# Patient Record
Sex: FEMALE | Race: WHITE | NOT HISPANIC OR LATINO | Employment: OTHER | URBAN - METROPOLITAN AREA
[De-identification: names, ages, dates, MRNs, and addresses within clinical notes are randomized per-mention and may not be internally consistent; named-entity substitution may affect disease eponyms.]

---

## 2017-09-27 ENCOUNTER — OFFICE VISIT (OUTPATIENT)
Dept: OBSTETRICS AND GYNECOLOGY | Facility: CLINIC | Age: 52
End: 2017-09-27

## 2017-09-27 VITALS
DIASTOLIC BLOOD PRESSURE: 70 MMHG | HEIGHT: 63 IN | WEIGHT: 131.4 LBS | SYSTOLIC BLOOD PRESSURE: 108 MMHG | BODY MASS INDEX: 23.28 KG/M2

## 2017-09-27 DIAGNOSIS — R10.32 LLQ ABDOMINAL PAIN: Primary | ICD-10-CM

## 2017-09-27 DIAGNOSIS — Z01.411 ENCOUNTER FOR GYNECOLOGICAL EXAMINATION WITH ABNORMAL FINDING: ICD-10-CM

## 2017-09-27 DIAGNOSIS — Z85.3 PERSONAL HISTORY OF BREAST CANCER: ICD-10-CM

## 2017-09-27 DIAGNOSIS — N60.12 FIBROCYSTIC BREAST CHANGES, BILATERAL: ICD-10-CM

## 2017-09-27 DIAGNOSIS — N60.11 FIBROCYSTIC BREAST CHANGES, BILATERAL: ICD-10-CM

## 2017-09-27 PROCEDURE — 99386 PREV VISIT NEW AGE 40-64: CPT | Performed by: OBSTETRICS & GYNECOLOGY

## 2017-09-27 RX ORDER — PROPRANOLOL HYDROCHLORIDE 80 MG/1
1 CAPSULE, EXTENDED RELEASE ORAL DAILY
COMMUNITY
Start: 2017-08-14 | End: 2020-12-30

## 2017-09-27 RX ORDER — SUMATRIPTAN 100 MG/1
100 TABLET, FILM COATED ORAL ONCE AS NEEDED
COMMUNITY

## 2017-09-28 ENCOUNTER — LAB (OUTPATIENT)
Dept: LAB | Facility: HOSPITAL | Age: 52
End: 2017-09-28

## 2017-09-28 ENCOUNTER — OFFICE VISIT (OUTPATIENT)
Dept: OBSTETRICS AND GYNECOLOGY | Facility: CLINIC | Age: 52
End: 2017-09-28

## 2017-09-28 VITALS
DIASTOLIC BLOOD PRESSURE: 70 MMHG | SYSTOLIC BLOOD PRESSURE: 108 MMHG | HEIGHT: 63 IN | WEIGHT: 131 LBS | BODY MASS INDEX: 23.21 KG/M2

## 2017-09-28 DIAGNOSIS — N83.201 OVARIAN CYST, RIGHT: ICD-10-CM

## 2017-09-28 DIAGNOSIS — R10.32 LLQ ABDOMINAL PAIN: ICD-10-CM

## 2017-09-28 DIAGNOSIS — R93.89 THICKENED ENDOMETRIUM: Primary | ICD-10-CM

## 2017-09-28 LAB — CANCER AG125 SERPL QL: 11.8 U/ML (ref 0–30.2)

## 2017-09-28 PROCEDURE — 86304 IMMUNOASSAY TUMOR CA 125: CPT | Performed by: OBSTETRICS & GYNECOLOGY

## 2017-09-28 PROCEDURE — 99213 OFFICE O/P EST LOW 20 MIN: CPT | Performed by: OBSTETRICS & GYNECOLOGY

## 2017-09-28 PROCEDURE — 36415 COLL VENOUS BLD VENIPUNCTURE: CPT

## 2017-10-05 ENCOUNTER — TELEPHONE (OUTPATIENT)
Dept: OBSTETRICS AND GYNECOLOGY | Facility: CLINIC | Age: 52
End: 2017-10-05

## 2017-10-26 ENCOUNTER — DOCUMENTATION (OUTPATIENT)
Dept: OBSTETRICS AND GYNECOLOGY | Facility: CLINIC | Age: 52
End: 2017-10-26

## 2017-10-26 ENCOUNTER — TELEPHONE (OUTPATIENT)
Dept: OBSTETRICS AND GYNECOLOGY | Facility: CLINIC | Age: 52
End: 2017-10-26

## 2017-11-03 ENCOUNTER — LAB REQUISITION (OUTPATIENT)
Dept: LAB | Facility: HOSPITAL | Age: 52
End: 2017-11-03

## 2017-11-03 ENCOUNTER — OUTSIDE FACILITY SERVICE (OUTPATIENT)
Dept: OBSTETRICS AND GYNECOLOGY | Facility: CLINIC | Age: 52
End: 2017-11-03

## 2017-11-03 DIAGNOSIS — R10.32 LEFT LOWER QUADRANT PAIN: ICD-10-CM

## 2017-11-03 PROCEDURE — 88305 TISSUE EXAM BY PATHOLOGIST: CPT | Performed by: OBSTETRICS & GYNECOLOGY

## 2017-11-03 PROCEDURE — 58558 HYSTEROSCOPY BIOPSY: CPT | Performed by: OBSTETRICS & GYNECOLOGY

## 2017-11-06 LAB
CYTO UR: NORMAL
LAB AP CASE REPORT: NORMAL
LAB AP CLINICAL INFORMATION: NORMAL
Lab: NORMAL
PATH REPORT.FINAL DX SPEC: NORMAL
PATH REPORT.GROSS SPEC: NORMAL

## 2017-12-02 PROBLEM — Z01.419 WELL WOMAN EXAM WITH ROUTINE GYNECOLOGICAL EXAM: Status: ACTIVE | Noted: 2017-12-02

## 2018-09-14 ENCOUNTER — HOSPITAL ENCOUNTER (OUTPATIENT)
Age: 53
End: 2018-09-14
Payer: COMMERCIAL

## 2018-09-14 DIAGNOSIS — R41.89: ICD-10-CM

## 2018-09-14 DIAGNOSIS — R53.83: ICD-10-CM

## 2018-09-14 DIAGNOSIS — R05: Primary | ICD-10-CM

## 2018-09-14 DIAGNOSIS — R53.81: ICD-10-CM

## 2018-09-14 LAB
ALBUMIN LEVEL: 4.2 GM/DL (ref 3.4–5)
ALBUMIN/GLOB SERPL: 1.6 {RATIO} (ref 1.1–1.8)
ALP ISO SERPL-ACNC: 54 U/L (ref 46–116)
ALT SERPLBLD-CCNC: 30 U/L (ref 12–78)
ANION GAP SERPL CALC-SCNC: 12.2 MEQ/L (ref 5–15)
AST SERPL QL: 19 U/L (ref 15–37)
BILIRUBIN,TOTAL: 0.7 MG/DL (ref 0.2–1)
BUN SERPL-MCNC: 7 MG/DL (ref 7–18)
CALCIUM SPEC-MCNC: 8.7 MG/DL (ref 8.5–10.1)
CHLORIDE SPEC-SCNC: 106 MMOL/L (ref 98–107)
CHOLEST SPEC-SCNC: 204 MG/DL (ref 140–200)
CO2 SERPL-SCNC: 29 MMOL/L (ref 21–32)
CREAT BLD-SCNC: 0.6 MG/DL (ref 0.55–1.02)
ESTIMATED GLOMERULAR FILT RATE: 104 ML/MIN (ref 60–?)
FT4I SERPL CALC-MCNC: 3.6 UG/DL (ref 5.93–13.13)
GFR (AFRICAN AMERICAN): 126 ML/MIN (ref 60–?)
GLOBULIN SER CALC-MCNC: 2.7 GM/DL (ref 1.3–3.2)
GLUCOSE: 79 MG/DL (ref 74–106)
HCT VFR BLD CALC: 39.6 % (ref 37–47)
HDLC SERPL-MCNC: 74 MG/DL (ref 29–89)
HGB BLD-MCNC: 13.3 G/DL (ref 12.2–16.2)
MCHC RBC-ENTMCNC: 33.6 G/DL (ref 31.8–35.4)
MCV RBC: 87.7 FL (ref 81–99)
MEAN CORPUSCULAR HEMOGLOBIN: 29.5 PG (ref 27–31.2)
PLATELET # BLD: 215 K/MM3 (ref 142–424)
POTASSIUM: 4.2 MMOL/L (ref 3.5–5.1)
PROT SERPL-MCNC: 6.9 GM/DL (ref 6.4–8.2)
RBC # BLD AUTO: 4.51 M/MM3 (ref 4.2–5.4)
SODIUM SPEC-SCNC: 143 MMOL/L (ref 136–145)
T3RU NFR SERPL: 36 % (ref 31–39)
T4 (THYROXINE): 9.9 UG/DL (ref 4.7–13.3)
TRIGLYCERIDES: 47 MG/DL (ref 30–200)
TSH SERPL-ACNC: 1.56 UIU/ML (ref 0.36–3.74)
WBC # BLD AUTO: 4.8 K/MM3 (ref 4.8–10.8)

## 2018-09-14 PROCEDURE — 84436 ASSAY OF TOTAL THYROXINE: CPT

## 2018-09-14 PROCEDURE — 36415 COLL VENOUS BLD VENIPUNCTURE: CPT

## 2018-09-14 PROCEDURE — 82131 AMINO ACIDS SINGLE QUANT: CPT

## 2018-09-14 PROCEDURE — 84443 ASSAY THYROID STIM HORMONE: CPT

## 2018-09-14 PROCEDURE — 84479 ASSAY OF THYROID (T3 OR T4): CPT

## 2018-09-14 PROCEDURE — 80053 COMPREHEN METABOLIC PANEL: CPT

## 2018-09-14 PROCEDURE — 82652 VIT D 1 25-DIHYDROXY: CPT

## 2018-09-14 PROCEDURE — 71046 X-RAY EXAM CHEST 2 VIEWS: CPT

## 2018-09-14 PROCEDURE — 85025 COMPLETE CBC W/AUTO DIFF WBC: CPT

## 2018-09-14 PROCEDURE — 80061 LIPID PANEL: CPT

## 2018-09-14 PROCEDURE — 82607 VITAMIN B-12: CPT

## 2018-09-17 LAB — VITAMIN B12: 581 PG/ML (ref 232–1245)

## 2018-11-27 ENCOUNTER — HOSPITAL ENCOUNTER (OUTPATIENT)
Age: 53
End: 2018-11-27
Payer: COMMERCIAL

## 2018-11-27 DIAGNOSIS — E11.65: ICD-10-CM

## 2018-11-27 DIAGNOSIS — E06.3: ICD-10-CM

## 2018-11-27 DIAGNOSIS — R53.82: Primary | ICD-10-CM

## 2018-11-27 DIAGNOSIS — R79.82: ICD-10-CM

## 2018-11-27 DIAGNOSIS — T78.40XA: ICD-10-CM

## 2018-11-27 DIAGNOSIS — E78.5: ICD-10-CM

## 2018-11-27 DIAGNOSIS — E53.8: ICD-10-CM

## 2018-11-27 DIAGNOSIS — D50.9: ICD-10-CM

## 2018-11-27 DIAGNOSIS — E78.49: ICD-10-CM

## 2018-11-27 DIAGNOSIS — E55.9: ICD-10-CM

## 2018-11-27 DIAGNOSIS — D51.9: ICD-10-CM

## 2018-11-27 DIAGNOSIS — E72.10: ICD-10-CM

## 2018-11-27 DIAGNOSIS — E72.19: ICD-10-CM

## 2018-11-27 DIAGNOSIS — E03.9: ICD-10-CM

## 2018-11-27 DIAGNOSIS — M35.9: ICD-10-CM

## 2018-11-27 DIAGNOSIS — D89.89: ICD-10-CM

## 2018-11-27 DIAGNOSIS — R73.01: ICD-10-CM

## 2018-11-27 LAB
ALBUMIN LEVEL: 4.2 GM/DL (ref 3.4–5)
ALBUMIN/GLOB SERPL: 1.4 {RATIO} (ref 1.1–1.8)
ALP ISO SERPL-ACNC: 46 U/L (ref 46–116)
ALT SERPLBLD-CCNC: 75 U/L (ref 12–78)
ANION GAP SERPL CALC-SCNC: 14.3 MEQ/L (ref 5–15)
AST SERPL QL: 55 U/L (ref 15–37)
BILIRUBIN,TOTAL: 0.6 MG/DL (ref 0.2–1)
BUN SERPL-MCNC: 10 MG/DL (ref 7–18)
CALCIUM SPEC-MCNC: 9.2 MG/DL (ref 8.5–10.1)
CHLORIDE SPEC-SCNC: 101 MMOL/L (ref 98–107)
CHOLEST SPEC-SCNC: 237 MG/DL (ref 140–200)
CO2 SERPL-SCNC: 26 MMOL/L (ref 21–32)
COLOR UR: YELLOW
CREAT BLD-SCNC: 0.58 MG/DL (ref 0.55–1.02)
ESTIMATED GLOMERULAR FILT RATE: 108 ML/MIN (ref 60–?)
FERRITIN SERPL-MCNC: 68 NG/ML (ref 8–388)
GFR (AFRICAN AMERICAN): 131 ML/MIN (ref 60–?)
GLOBULIN SER CALC-MCNC: 3 GM/DL (ref 1.3–3.2)
GLUCOSE: 79 MG/DL (ref 74–106)
HBA1C MFR BLD: 5.2 % (ref 0–7)
HDLC SERPL-MCNC: 95 MG/DL (ref 29–89)
KETONES UR STRIP.AUTO-MCNC: (no result) MG/DL
MICRO URNS: (no result)
PH UR: 6 [PH] (ref 5–8.5)
POTASSIUM: 4.3 MMOL/L (ref 3.5–5.1)
PROT SERPL-MCNC: 7.2 GM/DL (ref 6.4–8.2)
SODIUM SPEC-SCNC: 137 MMOL/L (ref 136–145)
SP GR UR: 1.01 (ref 1–1.03)
SQUAMOUS URNS QL MICRO: (no result) #/HPF (ref 0–5)
T4 FREE SERPL-MCNC: 1 NG/DL (ref 0.76–1.46)
TRIGLYCERIDES: 49 MG/DL (ref 30–200)
TSH SERPL-ACNC: 0.7 UIU/ML (ref 0.36–3.74)
UROBILINOGEN UR QL: 0.2 EU/DL

## 2018-11-27 PROCEDURE — 86800 THYROGLOBULIN ANTIBODY: CPT

## 2018-11-27 PROCEDURE — 36415 COLL VENOUS BLD VENIPUNCTURE: CPT

## 2018-11-27 PROCEDURE — 86663 EPSTEIN-BARR ANTIBODY: CPT

## 2018-11-27 PROCEDURE — 83525 ASSAY OF INSULIN: CPT

## 2018-11-27 PROCEDURE — 84443 ASSAY THYROID STIM HORMONE: CPT

## 2018-11-27 PROCEDURE — 86738 MYCOPLASMA ANTIBODY: CPT

## 2018-11-27 PROCEDURE — 84481 FREE ASSAY (FT-3): CPT

## 2018-11-27 PROCEDURE — 83735 ASSAY OF MAGNESIUM: CPT

## 2018-11-27 PROCEDURE — 80061 LIPID PANEL: CPT

## 2018-11-27 PROCEDURE — 81001 URINALYSIS AUTO W/SCOPE: CPT

## 2018-11-27 PROCEDURE — 82607 VITAMIN B-12: CPT

## 2018-11-27 PROCEDURE — 83540 ASSAY OF IRON: CPT

## 2018-11-27 PROCEDURE — 82785 ASSAY OF IGE: CPT

## 2018-11-27 PROCEDURE — 83090 ASSAY OF HOMOCYSTEINE: CPT

## 2018-11-27 PROCEDURE — 86038 ANTINUCLEAR ANTIBODIES: CPT

## 2018-11-27 PROCEDURE — 86665 EPSTEIN-BARR CAPSID VCA: CPT

## 2018-11-27 PROCEDURE — 80053 COMPREHEN METABOLIC PANEL: CPT

## 2018-11-27 PROCEDURE — 83550 IRON BINDING TEST: CPT

## 2018-11-27 PROCEDURE — 86376 MICROSOMAL ANTIBODY EACH: CPT

## 2018-11-27 PROCEDURE — 83036 HEMOGLOBIN GLYCOSYLATED A1C: CPT

## 2018-11-27 PROCEDURE — 84439 ASSAY OF FREE THYROXINE: CPT

## 2018-11-27 PROCEDURE — 86664 EPSTEIN-BARR NUCLEAR ANTIGEN: CPT

## 2018-11-27 PROCEDURE — 82728 ASSAY OF FERRITIN: CPT

## 2018-11-27 PROCEDURE — 86141 C-REACTIVE PROTEIN HS: CPT

## 2018-11-27 PROCEDURE — 82652 VIT D 1 25-DIHYDROXY: CPT

## 2018-11-28 LAB
EBV EA AB SER IA-ACNC: 40.1 U/ML (ref 0–8.9)
EBV NUCLEAR ANTIGEN AB, IGG: 88.7 U/ML (ref 0–17.9)
INSULIN SERPL-MCNC: 3.9 UIU/ML (ref 2.6–24.9)
IRON: 77 UG/DL (ref 27–159)
T3FREE SERPL-MCNC: 2.3 PG/ML (ref 2–4.4)
UIBC: 260 UG/DL (ref 131–425)
VITAMIN B12: 813 PG/ML (ref 232–1245)

## 2018-11-30 LAB
INSULIN SERPL-MCNC: 104.4 UIU/ML (ref 2.6–24.9)
MAGNESIUM RBC-MCNC: 5.5 MG/DL (ref 4.2–6.8)

## 2020-09-02 ENCOUNTER — HOSPITAL ENCOUNTER (OUTPATIENT)
Age: 55
End: 2020-09-02
Payer: COMMERCIAL

## 2020-09-02 DIAGNOSIS — R04.2: Primary | ICD-10-CM

## 2020-09-02 PROCEDURE — 71046 X-RAY EXAM CHEST 2 VIEWS: CPT

## 2020-12-02 ENCOUNTER — OFFICE VISIT (OUTPATIENT)
Dept: INTERNAL MEDICINE | Facility: CLINIC | Age: 55
End: 2020-12-02

## 2020-12-02 VITALS
SYSTOLIC BLOOD PRESSURE: 116 MMHG | BODY MASS INDEX: 20.91 KG/M2 | HEIGHT: 63 IN | TEMPERATURE: 97.1 F | DIASTOLIC BLOOD PRESSURE: 70 MMHG | HEART RATE: 68 BPM | WEIGHT: 118 LBS

## 2020-12-02 DIAGNOSIS — D05.11 NEOPLASM OF RIGHT BREAST, PRIMARY TUMOR STAGING CATEGORY TIS: DUCTAL CARCINOMA IN SITU (DCIS): ICD-10-CM

## 2020-12-02 DIAGNOSIS — G43.009 MIGRAINE WITHOUT AURA AND WITHOUT STATUS MIGRAINOSUS, NOT INTRACTABLE: ICD-10-CM

## 2020-12-02 DIAGNOSIS — E78.49 OTHER HYPERLIPIDEMIA: Primary | ICD-10-CM

## 2020-12-02 DIAGNOSIS — Z01.419 ENCOUNTER FOR GYNECOLOGICAL EXAMINATION: ICD-10-CM

## 2020-12-02 DIAGNOSIS — R07.89 LEFT-SIDED CHEST WALL PAIN: ICD-10-CM

## 2020-12-02 DIAGNOSIS — R10.12 LEFT UPPER QUADRANT ABDOMINAL PAIN: ICD-10-CM

## 2020-12-02 PROCEDURE — 99203 OFFICE O/P NEW LOW 30 MIN: CPT | Performed by: INTERNAL MEDICINE

## 2020-12-04 ENCOUNTER — HOSPITAL ENCOUNTER (OUTPATIENT)
Dept: GENERAL RADIOLOGY | Facility: HOSPITAL | Age: 55
Discharge: HOME | End: 2020-12-04
Admitting: INTERNAL MEDICINE

## 2020-12-04 PROCEDURE — 71101 X-RAY EXAM UNILAT RIBS/CHEST: CPT

## 2020-12-11 ENCOUNTER — HOSPITAL ENCOUNTER (OUTPATIENT)
Dept: CT IMAGING | Facility: HOSPITAL | Age: 55
Discharge: HOME | End: 2020-12-11
Admitting: INTERNAL MEDICINE

## 2020-12-11 PROCEDURE — 25010000002 IOPAMIDOL 61 % SOLUTION: Performed by: INTERNAL MEDICINE

## 2020-12-11 PROCEDURE — 74177 CT ABD & PELVIS W/CONTRAST: CPT

## 2020-12-11 RX ADMIN — IOPAMIDOL 95 ML: 612 INJECTION, SOLUTION INTRAVENOUS at 09:53

## 2020-12-22 ENCOUNTER — HOSPITAL ENCOUNTER (OUTPATIENT)
Age: 55
End: 2020-12-22
Payer: COMMERCIAL

## 2020-12-22 DIAGNOSIS — M25.552: Primary | ICD-10-CM

## 2020-12-22 PROCEDURE — 73502 X-RAY EXAM HIP UNI 2-3 VIEWS: CPT

## 2020-12-30 ENCOUNTER — OFFICE VISIT (OUTPATIENT)
Dept: OBSTETRICS AND GYNECOLOGY | Facility: CLINIC | Age: 55
End: 2020-12-30

## 2020-12-30 VITALS
SYSTOLIC BLOOD PRESSURE: 110 MMHG | HEIGHT: 63 IN | BODY MASS INDEX: 21.19 KG/M2 | DIASTOLIC BLOOD PRESSURE: 72 MMHG | WEIGHT: 119.6 LBS

## 2020-12-30 DIAGNOSIS — Z01.411 ENCOUNTER FOR GYNECOLOGICAL EXAMINATION WITH ABNORMAL FINDING: ICD-10-CM

## 2020-12-30 DIAGNOSIS — Z78.0 MENOPAUSE: Primary | ICD-10-CM

## 2020-12-30 DIAGNOSIS — R10.32 LLQ ABDOMINAL PAIN: ICD-10-CM

## 2020-12-30 PROBLEM — Z01.419 WELL WOMAN EXAM WITH ROUTINE GYNECOLOGICAL EXAM: Status: RESOLVED | Noted: 2017-12-02 | Resolved: 2020-12-30

## 2020-12-30 PROCEDURE — 99386 PREV VISIT NEW AGE 40-64: CPT | Performed by: OBSTETRICS & GYNECOLOGY

## 2020-12-30 RX ORDER — TRIAMCINOLONE ACETONIDE 1 MG/G
CREAM TOPICAL
COMMUNITY
Start: 2020-12-17 | End: 2022-05-02

## 2022-05-02 ENCOUNTER — OFFICE VISIT (OUTPATIENT)
Dept: OBSTETRICS AND GYNECOLOGY | Facility: CLINIC | Age: 57
End: 2022-05-02

## 2022-05-02 VITALS
SYSTOLIC BLOOD PRESSURE: 112 MMHG | DIASTOLIC BLOOD PRESSURE: 80 MMHG | BODY MASS INDEX: 21.09 KG/M2 | WEIGHT: 119 LBS | HEIGHT: 63 IN

## 2022-05-02 DIAGNOSIS — Z12.11 SCREENING FOR COLON CANCER: ICD-10-CM

## 2022-05-02 DIAGNOSIS — Z86.000 HISTORY OF DUCTAL CARCINOMA IN SITU (DCIS) OF BREAST: ICD-10-CM

## 2022-05-02 DIAGNOSIS — Z01.411 ENCOUNTER FOR GYNECOLOGICAL EXAMINATION WITH ABNORMAL FINDING: Primary | ICD-10-CM

## 2022-05-02 PROCEDURE — 99396 PREV VISIT EST AGE 40-64: CPT | Performed by: NURSE PRACTITIONER

## 2022-05-02 RX ORDER — FLASH GLUCOSE SENSOR
KIT MISCELLANEOUS SEE ADMIN INSTRUCTIONS
COMMUNITY
Start: 2022-03-24

## 2022-05-03 DIAGNOSIS — Z12.11 ENCOUNTER FOR SCREENING COLONOSCOPY: Primary | ICD-10-CM

## 2022-12-09 ENCOUNTER — HOSPITAL ENCOUNTER (OUTPATIENT)
Age: 57
End: 2022-12-09
Payer: COMMERCIAL

## 2022-12-09 DIAGNOSIS — B95.2: ICD-10-CM

## 2022-12-09 DIAGNOSIS — B95.7: ICD-10-CM

## 2022-12-09 DIAGNOSIS — R05.8: Primary | ICD-10-CM

## 2022-12-09 PROCEDURE — 87077 CULTURE AEROBIC IDENTIFY: CPT

## 2022-12-09 PROCEDURE — 87205 SMEAR GRAM STAIN: CPT

## 2022-12-09 PROCEDURE — 87070 CULTURE OTHR SPECIMN AEROBIC: CPT

## 2022-12-09 PROCEDURE — 87186 SC STD MICRODIL/AGAR DIL: CPT

## 2023-05-04 ENCOUNTER — OFFICE VISIT (OUTPATIENT)
Dept: OBSTETRICS AND GYNECOLOGY | Facility: CLINIC | Age: 58
End: 2023-05-04
Payer: COMMERCIAL

## 2023-05-04 VITALS
WEIGHT: 118.4 LBS | SYSTOLIC BLOOD PRESSURE: 108 MMHG | BODY MASS INDEX: 20.98 KG/M2 | DIASTOLIC BLOOD PRESSURE: 72 MMHG | HEIGHT: 63 IN

## 2023-05-04 DIAGNOSIS — Z01.411 ENCOUNTER FOR GYNECOLOGICAL EXAMINATION WITH ABNORMAL FINDING: Primary | ICD-10-CM

## 2023-05-04 DIAGNOSIS — N95.2 ATROPHY OF VAGINA: ICD-10-CM

## 2023-05-04 RX ORDER — BUDESONIDE 1 MG/2ML
INHALANT ORAL AS NEEDED
COMMUNITY
Start: 2023-03-06

## 2023-05-04 RX ORDER — MELATONIN
1000 DAILY
COMMUNITY

## 2023-05-12 ENCOUNTER — TELEPHONE (OUTPATIENT)
Dept: OBSTETRICS AND GYNECOLOGY | Facility: CLINIC | Age: 58
End: 2023-05-12
Payer: COMMERCIAL

## 2023-05-15 ENCOUNTER — TELEPHONE (OUTPATIENT)
Dept: OBSTETRICS AND GYNECOLOGY | Facility: CLINIC | Age: 58
End: 2023-05-15
Payer: COMMERCIAL

## 2023-05-15 RX ORDER — ESTRADIOL 10 UG/1
1 INSERT VAGINAL 2 TIMES WEEKLY
Qty: 8 TABLET | Refills: 12 | Status: SHIPPED | OUTPATIENT
Start: 2023-05-15

## 2024-03-25 ENCOUNTER — TELEPHONE (OUTPATIENT)
Dept: OBSTETRICS AND GYNECOLOGY | Facility: CLINIC | Age: 59
End: 2024-03-25

## 2024-04-23 ENCOUNTER — OFFICE VISIT (OUTPATIENT)
Dept: OBSTETRICS AND GYNECOLOGY | Facility: CLINIC | Age: 59
End: 2024-04-23
Payer: COMMERCIAL

## 2024-04-23 VITALS
HEIGHT: 63 IN | WEIGHT: 126 LBS | SYSTOLIC BLOOD PRESSURE: 110 MMHG | BODY MASS INDEX: 22.32 KG/M2 | DIASTOLIC BLOOD PRESSURE: 76 MMHG

## 2024-04-23 DIAGNOSIS — R35.0 URINARY FREQUENCY: Primary | ICD-10-CM

## 2024-04-23 DIAGNOSIS — R10.2 PELVIC PAIN: ICD-10-CM

## 2024-04-23 DIAGNOSIS — R39.15 URINARY URGENCY: ICD-10-CM

## 2024-04-23 LAB
BILIRUB UR QL STRIP: NEGATIVE
CLARITY UR: CLEAR
COLOR UR: YELLOW
GLUCOSE UR STRIP-MCNC: NEGATIVE MG/DL
HGB UR QL STRIP.AUTO: NEGATIVE
HOLD SPECIMEN: NORMAL
KETONES UR QL STRIP: NEGATIVE
LEUKOCYTE ESTERASE UR QL STRIP.AUTO: NEGATIVE
NITRITE UR QL STRIP: NEGATIVE
PH UR STRIP.AUTO: 8.5 [PH] (ref 5–8)
PROT UR QL STRIP: NEGATIVE
SP GR UR STRIP: 1.01 (ref 1–1.03)
UROBILINOGEN UR QL STRIP: ABNORMAL

## 2024-04-23 PROCEDURE — 99213 OFFICE O/P EST LOW 20 MIN: CPT | Performed by: NURSE PRACTITIONER

## 2024-04-23 PROCEDURE — 81003 URINALYSIS AUTO W/O SCOPE: CPT | Performed by: NURSE PRACTITIONER

## 2024-04-23 PROCEDURE — 99459 PELVIC EXAMINATION: CPT | Performed by: NURSE PRACTITIONER

## 2024-06-20 ENCOUNTER — OFFICE VISIT (OUTPATIENT)
Dept: OBSTETRICS AND GYNECOLOGY | Facility: CLINIC | Age: 59
End: 2024-06-20
Payer: COMMERCIAL

## 2024-06-20 VITALS
WEIGHT: 121 LBS | RESPIRATION RATE: 16 BRPM | DIASTOLIC BLOOD PRESSURE: 60 MMHG | BODY MASS INDEX: 21.43 KG/M2 | SYSTOLIC BLOOD PRESSURE: 104 MMHG

## 2024-06-20 DIAGNOSIS — R10.2 PELVIC PAIN: ICD-10-CM

## 2024-06-20 DIAGNOSIS — Z01.411 ENCOUNTER FOR WELL WOMAN EXAM WITH ABNORMAL FINDINGS: Primary | ICD-10-CM

## 2024-06-25 LAB — REF LAB TEST METHOD: NORMAL

## 2024-08-06 ENCOUNTER — TELEPHONE (OUTPATIENT)
Dept: OBSTETRICS AND GYNECOLOGY | Facility: CLINIC | Age: 59
End: 2024-08-06
Payer: COMMERCIAL

## 2024-08-13 RX ORDER — ESTRADIOL 10 UG/1
1 INSERT VAGINAL 2 TIMES WEEKLY
Qty: 8 TABLET | Refills: 12 | Status: SHIPPED | OUTPATIENT
Start: 2024-08-13

## 2024-08-13 RX ORDER — ESTRADIOL 10 UG/1
1 INSERT VAGINAL 2 TIMES WEEKLY
Qty: 8 TABLET | Refills: 12 | Status: SHIPPED | OUTPATIENT
Start: 2024-08-15 | End: 2024-08-13

## 2025-06-27 ENCOUNTER — OFFICE VISIT (OUTPATIENT)
Dept: OBSTETRICS AND GYNECOLOGY | Facility: CLINIC | Age: 60
End: 2025-06-27
Payer: COMMERCIAL

## 2025-06-27 VITALS
RESPIRATION RATE: 16 BRPM | WEIGHT: 125 LBS | DIASTOLIC BLOOD PRESSURE: 70 MMHG | SYSTOLIC BLOOD PRESSURE: 116 MMHG | BODY MASS INDEX: 22.14 KG/M2

## 2025-06-27 DIAGNOSIS — Z13.820 OSTEOPOROSIS SCREENING: ICD-10-CM

## 2025-06-27 DIAGNOSIS — Z01.419 WELL WOMAN EXAM WITH ROUTINE GYNECOLOGICAL EXAM: Primary | ICD-10-CM

## 2025-06-27 RX ORDER — B-COMPLEX WITH VITAMIN C
1 TABLET ORAL DAILY
COMMUNITY

## 2025-06-27 RX ORDER — ESTRADIOL 0.1 MG/G
CREAM VAGINAL
Qty: 1 EACH | Refills: 12 | Status: SHIPPED | OUTPATIENT
Start: 2025-06-27

## 2025-06-27 RX ORDER — BUDESONIDE 1 MG/2ML
1 INHALANT ORAL
COMMUNITY

## 2025-06-27 RX ORDER — SIROLIMUS 2 MG/1
4 TABLET, FILM COATED ORAL DAILY
COMMUNITY

## 2025-06-27 NOTE — PROGRESS NOTES
"Subjective   Chief Complaint   Patient presents with    Annual Exam     Jaylyn Coon is a 59 y.o. year old  menopausal female presenting to be seen for her annual exam.  This past year she has been on hormone replacement therapy (estradiol and testosterone topical and progesterone oral- she gets this from Dr Gela Conn).  There has not been vaginal bleeding in the last 12 months.  Menopausal symptoms are present (insomnia, moodiness, no energy, and much improved) but not affecting her quality of life .    SEXUAL Hx:  She is currently sexually active.  In the past year there there has been NO new sexual partners.    Condoms are never used.  She would not like to be screened for STD's at today's exam.  [place] is painful: yes - but OTC lubricants ARE effective- she has stopped vagifem when she started using topical HRT in 2024  HEALTH Hx:  She exercises regularly: yes.- She has a new exercise bike ( a \"camilo bike\")  She wears her seat belt: yes.  She has concerns about domestic violence: no.  She has noticed changes in height: no.  OTHER THINGS SHE WANTS TO DISCUSS TODAY:  Nothing else    The following portions of the patient's history were reviewed and updated as appropriate:problem list, current medications, allergies, past family history, past medical history, past social history, and past surgical history.    Social History    Tobacco Use      Smoking status: Former      Smokeless tobacco: Never    Past Medical History:   Diagnosis Date    GERD (gastroesophageal reflux disease)     Joint pain     Menopause     Migraine     Right DCIS of breast      Past Surgical History:   Procedure Laterality Date    BREAST BIOPSY      BREAST LUMPECTOMY  2010     SECTION  2007    CHOLECYSTECTOMY  2009    D & C HYSTEROSCOPY MYOSURE  2017    [facility] (pathology = benign)     DILATATION AND CURETTAGE  01/10/2014    Dr. Pryor - polypectomy    ECTOPIC PREGNANCY  2006         Review " of Systems   Constitutional: Negative.  Negative for appetite change and diaphoresis.   Respiratory: Negative.     Cardiovascular: Negative.    Gastrointestinal: Negative.  Negative for abdominal distention, blood in stool, GERD and indigestion.   Endocrine: Negative.    Genitourinary: Negative.  Negative for breast discharge, breast lump, breast pain, dysuria and hematuria.   Skin: Negative.           Objective   /70   Resp 16   Wt 56.7 kg (125 lb)   LMP 09/10/2017 (Approximate)   BMI 22.14 kg/m²     Physical Exam  Vitals and nursing note reviewed. Exam conducted with a chaperone present.   Constitutional:       Appearance: Normal appearance.   Cardiovascular:      Rate and Rhythm: Normal rate and regular rhythm.      Heart sounds: Normal heart sounds.   Pulmonary:      Effort: Pulmonary effort is normal.      Breath sounds: Normal breath sounds.   Chest:   Breasts:     Right: Normal.      Left: Normal.   Genitourinary:     General: Normal vulva.      Exam position: Lithotomy position.      Vagina: Normal.      Cervix: Normal.      Uterus: Normal.       Adnexa: Right adnexa normal and left adnexa normal.      Rectum: Normal.      Comments: Mild mucosal changes consistne with genitourinary syndrome of menopause  Digital rectal exam not done but appears normal visually  Neurological:      Mental Status: She is alert and oriented to person, place, and time.   Psychiatric:         Mood and Affect: Mood normal.         Behavior: Behavior normal.         Thought Content: Thought content normal.         Judgment: Judgment normal.            Diagnoses and all orders for this visit:    1. Well woman exam with routine gynecological exam (Primary)    2. Osteoporosis screening  -     DEXA Bone Density Axial; Future    Other orders  -     estradiol (ESTRACE VAGINAL) 0.1 MG/GM vaginal cream; Insert 1 gm intravaginally 1-3 times each week  Dispense: 1 each; Refill: 12    Pap was not done today.  I explained to Jaylyn  that the recommendations for Pap smear interval in a low risk patient has lengthened to 3 years time.  I told Jaylyn she still needs to be seen in our office yearly for a full physical including breast and pelvic exam.    Discussed adding vaginal estrogen back in- she is agreeable to this. She prefers to try cream. Reviewed how to use. She voices understanding and appreciation.        She is up to date on all relevant gynecologic and colorectal screenings except DEXA's for osteoporosis    The importance of keeping all planned follow-up and taking all medications as prescribed was emphasized.    Today I discussed with Jaylyn the total recommended calcium intake for a post-menopausal female is 1200 mg.  Ideally this should be from dietary sources.  I reviewed calcium content in various foods including milk, fortified orange juice and yogurt.  If she cannot get sufficient calcium through dietary means, it is recommended to supplement with either a multivitamin or calcium to reach her daily goal.  I also reviewed the difference in the bioavailability of calcium carbonate and calcium citrate containing supplements and the importance of taking calcium carbonate containing products with food. Finally, vitamin D's role in calcium absorption was reviewed and a total daily vitamin D intake of 600 units was recommended.    Her vaccine record was reviewed and updated.    I discussed with Jaylyn that she may be behind on needed vaccinations for immunizations are up to date.  She may be able to obtain these vaccinations at her local pharmacy OR speak about obtaining them with her primary care.  If she does obtain her vaccines, I have asked Jaylyn to let us know the date each vaccine was obtained so that her medical record could be updated in our system.    New Medications Ordered This Visit   Medications    estradiol (ESTRACE VAGINAL) 0.1 MG/GM vaginal cream     Sig: Insert 1 gm intravaginally 1-3 times each week      Dispense:  1 each     Refill:  12     Group # [id number]; ID # [id number]            Return in about 1 year (around 6/27/2026) for Annual physical.    Jocy Bone, MARIBEL  June 27, 2025

## 2025-08-15 ENCOUNTER — HOSPITAL ENCOUNTER (OUTPATIENT)
Dept: BONE DENSITY | Facility: HOSPITAL | Age: 60
Discharge: HOME | End: 2025-08-15
Payer: COMMERCIAL

## 2025-08-15 DIAGNOSIS — Z13.820 OSTEOPOROSIS SCREENING: ICD-10-CM

## 2025-08-15 PROCEDURE — 77080 DXA BONE DENSITY AXIAL: CPT
